# Patient Record
(demographics unavailable — no encounter records)

---

## 2025-03-28 NOTE — PHYSICAL EXAM
[No Acute Distress] : no acute distress [Well-Appearing] : well-appearing [Normal Oropharynx] : the oropharynx was normal [No Edema] : there was no peripheral edema [No CVA Tenderness] : no CVA  tenderness [No Spinal Tenderness] : no spinal tenderness [Grossly Normal Strength/Tone] : grossly normal strength/tone [No Focal Deficits] : no focal deficits [Normal Gait] : normal gait [Normal] : affect was normal and insight and judgment were intact

## 2025-03-31 NOTE — HISTORY OF PRESENT ILLNESS
[FreeTextEntry8] : 49F no significant PMH presents for right breast pain, headache, pain in waist, and numbness in the right hand. - pt has progressive right breast pain for over 20 years. had biopsy done 20y ago that showed fibrocystic changes. has bilateral nipple discharge for the past 30y after her last child. LMP 2024. was getting periods monthly and then every 2mo over the past year. pt is  5 para 4 aborta 1. pt has 4 children born full term , and had 1 miscarriage. - for 2mo having constant bitemporal frontal to occipital headache. no change with position. denies fever, cough, sore throat, congestion, sick contacts, recent travel. sees stars approx 5min before onset of headache. +photophobia, phonophobia. takes 2 ibuprofen which helps. - fell on her buttocks while going downstairs 3mo ago. now with pain on left side lower back since the fall to buttocks. weakness, numbness, incontinence, weight loss. - right hand numbness in first 3 digits for approx 8mo worst when waking up in the morning. stretches to calm it down. sleeps on her left side. works cleaning houses daily. Pt immigrated from [facility] 2 years ago and lives with her .

## 2025-03-31 NOTE — HISTORY OF PRESENT ILLNESS
[FreeTextEntry8] : 49F no significant PMH presents for right breast pain, headache, pain in waist, and numbness in the right hand. - pt has progressive right breast pain for over 20 years. had biopsy done 20y ago that showed fibrocystic changes. has bilateral nipple discharge for the past 30y after her last child. LMP 2024. was getting periods monthly and then every 2mo over the past year. pt is  5 para 4 aborta 1. pt has 4 children born full term , and had 1 miscarriage. - for 2mo having constant bitemporal frontal to occipital headache. no change with position. denies fever, cough, sore throat, congestion, sick contacts, recent travel. sees stars approx 5min before onset of headache. +photophobia, phonophobia. takes 2 ibuprofen which helps. - fell on her buttocks while going downstairs 3mo ago. now with pain on left side lower back since the fall to buttocks. weakness, numbness, incontinence, weight loss. - right hand numbness in first 3 digits for approx 8mo worst when waking up in the morning. stretches to calm it down. sleeps on her left side. works cleaning houses daily. Pt immigrated from [facility] 2 years ago and lives with her .

## 2025-03-31 NOTE — END OF VISIT
[] : Resident [FreeTextEntry3] : Case discussed with Dr. Wilkins. Patient with lengthy history of bilateral white nipple discharge after stopping breastfeeding. Agree with prolactin, TSH, estradiol, diagnostic mammo and breast sonogram. Agree with rest of workup above.

## 2025-03-31 NOTE — HISTORY OF PRESENT ILLNESS
[FreeTextEntry8] : 49F no significant PMH presents for right breast pain, headache, pain in waist, and numbness in the right hand. - pt has progressive right breast pain for over 20 years. had biopsy done 20y ago that showed fibrocystic changes. has bilateral nipple discharge for the past 30y after her last child. LMP 2024. was getting periods monthly and then every 2mo over the past year. pt is  5 para 4 aborta 1. pt has 4 children born full term , and had 1 miscarriage. - for 2mo having constant bitemporal frontal to occipital headache. no change with position. denies fever, cough, sore throat, congestion, sick contacts, recent travel. sees stars approx 5min before onset of headache. +photophobia, phonophobia. takes 2 ibuprofen which helps. - fell on her buttocks while going downstairs 3mo ago. now with pain on left side lower back since the fall to buttocks. weakness, numbness, incontinence, weight loss. - right hand numbness in first 3 digits for approx 8mo worst when waking up in the morning. stretches to calm it down. sleeps on her left side. works cleaning houses daily. Pt immigrated from [facility] 2 years ago and lives with her .

## 2025-06-30 NOTE — PHYSICAL EXAM
[Normal] : normal rate, regular rhythm, normal S1 and S2 and no murmur heard [de-identified] : obese [de-identified] : limited ROM, localized left neck swelling that warmth, buffalo hump noted [de-identified] : rash upper back improving to near resolution, markings washed off

## 2025-06-30 NOTE — REVIEW OF SYSTEMS
[Back Pain] : back pain [Itching] : Itching [Skin Rash] : skin rash [Negative] : Gastrointestinal [de-identified] : (+)lump of upper back

## 2025-06-30 NOTE — HISTORY OF PRESENT ILLNESS
[FreeTextEntry1] : follow-up back pain [de-identified] : Patient is a 49F presenting with upper back pain, redness, and warmth that started 2-3 weeks ago. Associated with "lump" on the neck. Does not recall any insect bites and denies trauma to the area. Reports that redness and size of lump has been growing. She has taken pain medications without relief of symptoms. Also noted with itchy rash on left upper chest.

## 2025-06-30 NOTE — INTERPRETER SERVICES
[Language Line ] : provided by [employer]   [Interpreters_IDNumber] : [id number] [Interpreters_FullName] : Jose [TWNoteComboBox1] : [place]

## 2025-06-30 NOTE — PHYSICAL EXAM
[No Acute Distress] : no acute distress [Well Nourished] : well nourished [Well Developed] : well developed [Supple] : supple [Normal Affect] : the affect was normal [Alert and Oriented x3] : oriented to person, place, and time [Normal Insight/Judgement] : insight and judgment were intact [de-identified] : (+)appears uncomfortable [de-identified] : posterior neck with large area of redness, warmth, and hardness, no induration. tender to touch. questionable sites of insect bites noted.

## 2025-06-30 NOTE — PHYSICAL EXAM
[Normal] : normal rate, regular rhythm, normal S1 and S2 and no murmur heard [de-identified] : obese [de-identified] : limited ROM, localized left neck swelling that warmth, buffalo hump noted [de-identified] : rash upper back improving to near resolution, markings washed off

## 2025-06-30 NOTE — PHYSICAL EXAM
[Normal] : normal rate, regular rhythm, normal S1 and S2 and no murmur heard [de-identified] : obese [de-identified] : limited ROM, localized left neck swelling that warmth, buffalo hump noted [de-identified] : rash upper back improving to near resolution, markings washed off

## 2025-07-02 NOTE — HISTORY OF PRESENT ILLNESS
[FreeTextEntry1] : follow up [de-identified] : 49F with history of obesity presents for cellulitis follow up. Pt started on Abx  which she is currently taking but reports ongoing  eft sided lump/swelling for 1 y, now pain with movement of  neck pain bilaterally associated with warmth and decreased ROM neck

## 2025-07-02 NOTE — HISTORY OF PRESENT ILLNESS
[FreeTextEntry1] : follow up [de-identified] : 49F with history of obesity presents for cellulitis follow up. Pt started on Abx  which she is currently taking but reports ongoing  eft sided lump/swelling for 1 y, now pain with movement of  neck pain bilaterally associated with warmth and decreased ROM neck

## 2025-07-02 NOTE — REVIEW OF SYSTEMS
[Joint Stiffness] : joint stiffness [Negative] : Neurological [FreeTextEntry4] : neck pain, limited rom of neck, left neck swelling

## 2025-07-02 NOTE — HISTORY OF PRESENT ILLNESS
[FreeTextEntry1] : follow up [de-identified] : 49F with history of obesity presents for cellulitis follow up. Pt started on Abx  which she is currently taking but reports ongoing  eft sided lump/swelling for 1 y, now pain with movement of  neck pain bilaterally associated with warmth and decreased ROM neck